# Patient Record
Sex: FEMALE | Race: BLACK OR AFRICAN AMERICAN | NOT HISPANIC OR LATINO | URBAN - METROPOLITAN AREA
[De-identification: names, ages, dates, MRNs, and addresses within clinical notes are randomized per-mention and may not be internally consistent; named-entity substitution may affect disease eponyms.]

---
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
## 2020-01-22 NOTE — ED ADULT NURSE NOTE - NEURO ASSESSMENT
Post-Care Instructions: I reviewed with the patient in detail post-care instructions. Patient is to wear sunprotection, and avoid picking at any of the treated lesions. Pt may apply Vaseline to crusted or scabbing areas. Detail Level: Detailed Render Post-Care Instructions In Note?: no Duration Of Freeze Thaw-Cycle (Seconds): 0 Consent: The patient's consent was obtained including but not limited to risks of crusting, scabbing, blistering, scarring, darker or lighter pigmentary change, recurrence, incomplete removal and infection. WDL

## 2021-09-12 ENCOUNTER — EMERGENCY (EMERGENCY)
Facility: HOSPITAL | Age: 21
LOS: 1 days | Discharge: HOME | End: 2021-09-12
Attending: EMERGENCY MEDICINE | Admitting: EMERGENCY MEDICINE
Payer: SELF-PAY

## 2021-09-12 VITALS
DIASTOLIC BLOOD PRESSURE: 52 MMHG | OXYGEN SATURATION: 100 % | TEMPERATURE: 97 F | HEART RATE: 102 BPM | RESPIRATION RATE: 16 BRPM | SYSTOLIC BLOOD PRESSURE: 115 MMHG

## 2021-09-12 VITALS
TEMPERATURE: 98 F | HEART RATE: 91 BPM | RESPIRATION RATE: 16 BRPM | HEIGHT: 66 IN | DIASTOLIC BLOOD PRESSURE: 74 MMHG | OXYGEN SATURATION: 96 % | SYSTOLIC BLOOD PRESSURE: 117 MMHG

## 2021-09-12 PROBLEM — Z78.9 OTHER SPECIFIED HEALTH STATUS: Chronic | Status: ACTIVE | Noted: 2019-09-11

## 2021-09-12 PROCEDURE — 99284 EMERGENCY DEPT VISIT MOD MDM: CPT

## 2021-09-12 PROCEDURE — 99053 MED SERV 10PM-8AM 24 HR FAC: CPT

## 2021-09-12 NOTE — ED PROVIDER NOTE - CLINICAL SUMMARY MEDICAL DECISION MAKING FREE TEXT BOX
20 yo F presenting for acute alcohol intoxication and vomiting. On arrival to ED pt no longer vomiting, not complaining of anything. Pt able to ambulate without assistance. Pt notes she can order a cab or reach out to a friend to get home.

## 2021-09-12 NOTE — ED PROVIDER NOTE - NSFOLLOWUPINSTRUCTIONS_ED_ALL_ED_FT
Alcohol Intoxication  WHAT YOU NEED TO KNOW:  Alcohol intoxication is a harmful physical condition caused when you drink more alcohol than your body can handle. It is also called ethanol poisoning, or being drunk.  DISCHARGE INSTRUCTIONS:  Call your local emergency number (911 in the ) if:   •You have sudden trouble breathing or chest pain.  •You have a seizure.  •You feel sad enough to harm yourself or others.  Call your doctor if:   •You have hallucinations (you see or hear things that are not real).  •You cannot stop vomiting.  •You have questions or concerns about your condition or care.  Recommended alcohol limits:   •Men 21 to 64 years should limit alcohol to 2 drinks a day. Do not have more than 4 drinks in 1 day or more than 14 in 1 week.  •All women, and men 65 or older should limit alcohol to 1 drink in a day. Do not have more than 3 drinks in 1 day or more than 7 in 1 week. No amount of alcohol is okay during pregnancy.  Manage alcohol use:   •Decrease the amount you drink. This can help prevent health problems such as brain, heart, and liver damage, high blood pressure, diabetes, and cancer. If you cannot stop completely, healthcare providers can help you set goals to decrease the amount you drink.  •Plan weekly alcohol use. You will be less likely to drink more than the recommended limit if you plan ahead.  •Have food when you drink alcohol. Food will prevent alcohol from getting into your system too quickly. Eat before you have your first alcohol drink.  •Time your drinks carefully. Have no more than 1 drink in an hour. Have a liquid such as water, coffee, or a soft drink between alcohol drinks.  •Do not drive if you have had alcohol. Make sure someone who has not been drinking can help you get home.  •Do not drink alcohol if you are taking medicine. Alcohol is dangerous when you combine it with certain medicines, such as acetaminophen or blood pressure medicine. Talk to your healthcare provider about all the medicines you currently take.  For more information:   •Alcoholics Anonymous  Web Address: http://www.aa.org  •Substance Abuse and Mental Health Services Administration  [address]  [place] [address]  Web Address: http://www.[facility].gov  Follow up with your healthcare provider as directed: Write down your questions so you remember to ask them during your visits.

## 2021-09-12 NOTE — ED PROVIDER NOTE - PHYSICAL EXAMINATION
General: WN/WD NAD  Head: Atraumatic, normocephalic  Eyes: EOM grossly in tact, no scleral icterus  ENT: moist mucous membranes  Neurology: A&Ox3, nonfocal, BLACK x 4, answers questions appropriately  Respiratory: normal respiratory effort, CTAB, no wheezing  CV: Extremities warm and well perfused, RRR  Abdominal: Soft, non-distended, non tender  Extremities: No edema  Skin: No rashes

## 2021-09-12 NOTE — ED ADULT NURSE NOTE - NSICDXPASTMEDICALHX_GEN_ALL_CORE_FT
Intubated.  Managed by critical care team.  Start enteral feeding.   PAST MEDICAL HISTORY:  No pertinent past medical history

## 2021-09-12 NOTE — ED ADULT TRIAGE NOTE - CHIEF COMPLAINT QUOTE
patient arrives intoxicated was found on side of street called by friend actively vomiting. patient had a lot of tequila to drink to night, no drug use. arouseable to physical stimuli, physical belongings secured acros room 21. valubles in security.

## 2021-09-12 NOTE — ED PROVIDER NOTE - OBJECTIVE STATEMENT
Ms. Harden is a 22 yo woman with anemia presenting for intoxication and vomiting. She notes that she went out tonight and drank a lot of tequila. She denies SI and notes it was a fun night she just drank too much. At this time not complaining of anything, denies nausea. She has no allergies and doesn't take any medications. She remembers everything that happened and feels safe going home.

## 2021-09-12 NOTE — ED PROVIDER NOTE - PATIENT PORTAL LINK FT
You can access the FollowMyHealth Patient Portal offered by [facility] by registering at the following website: [email or web address]. By joining [employer]’s FollowMyHealth portal, you will also be able to view your health information using other applications (apps) compatible with our system.

## 2021-09-12 NOTE — ED ADULT NURSE NOTE - OBJECTIVE STATEMENT
Patient A&Ox4 ambulatory coming in for intoxication. Patient states she was out with her friends and drank too much Tequila. Patient states she vomited a few times and feels much better now, would like to be sent home to rest. Respirations even and unlabored. Vitals as noted. MD at bedside for evaluation. Awaiting further orders. Belongings across 21 and valuables at security. Stretcher in lowest position, wheels locked, appropriate side rails in place, call bell in reach.

## 2023-08-11 ENCOUNTER — EMERGENCY (EMERGENCY)
Facility: HOSPITAL | Age: 23
LOS: 0 days | Discharge: HOME | End: 2023-08-11
Attending: STUDENT IN AN ORGANIZED HEALTH CARE EDUCATION/TRAINING PROGRAM
Payer: COMMERCIAL

## 2023-08-11 VITALS
TEMPERATURE: 98 F | HEART RATE: 81 BPM | HEIGHT: 66 IN | SYSTOLIC BLOOD PRESSURE: 132 MMHG | RESPIRATION RATE: 18 BRPM | OXYGEN SATURATION: 99 % | DIASTOLIC BLOOD PRESSURE: 92 MMHG | WEIGHT: 240.08 LBS

## 2023-08-11 VITALS
HEART RATE: 84 BPM | SYSTOLIC BLOOD PRESSURE: 128 MMHG | RESPIRATION RATE: 18 BRPM | OXYGEN SATURATION: 100 % | TEMPERATURE: 98 F | DIASTOLIC BLOOD PRESSURE: 88 MMHG

## 2023-08-11 DIAGNOSIS — R10.31 RIGHT LOWER QUADRANT PAIN: ICD-10-CM

## 2023-08-11 DIAGNOSIS — N94.6 DYSMENORRHEA, UNSPECIFIED: ICD-10-CM

## 2023-08-11 DIAGNOSIS — R10.32 LEFT LOWER QUADRANT PAIN: ICD-10-CM

## 2023-08-11 LAB
ALBUMIN SERPL ELPH-MCNC: 3.8 G/DL — SIGNIFICANT CHANGE UP (ref 3.3–5)
ALP SERPL-CCNC: 73 U/L — SIGNIFICANT CHANGE UP (ref 40–120)
ALT FLD-CCNC: 48 U/L — SIGNIFICANT CHANGE UP (ref 12–78)
ANION GAP SERPL CALC-SCNC: 4 MMOL/L — LOW (ref 5–17)
APPEARANCE UR: ABNORMAL
AST SERPL-CCNC: 26 U/L — SIGNIFICANT CHANGE UP (ref 15–37)
BACTERIA # UR AUTO: ABNORMAL
BASOPHILS # BLD AUTO: 0.07 K/UL — SIGNIFICANT CHANGE UP (ref 0–0.2)
BASOPHILS NFR BLD AUTO: 1 % — SIGNIFICANT CHANGE UP (ref 0–2)
BILIRUB SERPL-MCNC: 0.8 MG/DL — SIGNIFICANT CHANGE UP (ref 0.2–1.2)
BILIRUB UR-MCNC: NEGATIVE — SIGNIFICANT CHANGE UP
BUN SERPL-MCNC: 7 MG/DL — SIGNIFICANT CHANGE UP (ref 7–23)
CALCIUM SERPL-MCNC: 9 MG/DL — SIGNIFICANT CHANGE UP (ref 8.5–10.1)
CHLORIDE SERPL-SCNC: 109 MMOL/L — HIGH (ref 96–108)
CO2 SERPL-SCNC: 26 MMOL/L — SIGNIFICANT CHANGE UP (ref 22–31)
COLOR SPEC: YELLOW — SIGNIFICANT CHANGE UP
CREAT SERPL-MCNC: 0.74 MG/DL — SIGNIFICANT CHANGE UP (ref 0.5–1.3)
DIFF PNL FLD: ABNORMAL
EGFR: 117 ML/MIN/1.73M2 — SIGNIFICANT CHANGE UP
EOSINOPHIL # BLD AUTO: 0.22 K/UL — SIGNIFICANT CHANGE UP (ref 0–0.5)
EOSINOPHIL NFR BLD AUTO: 3.2 % — SIGNIFICANT CHANGE UP (ref 0–6)
EPI CELLS # UR: SIGNIFICANT CHANGE UP
GLUCOSE SERPL-MCNC: 86 MG/DL — SIGNIFICANT CHANGE UP (ref 70–99)
GLUCOSE UR QL: NEGATIVE MG/DL — SIGNIFICANT CHANGE UP
HCG SERPL-ACNC: <1 MIU/ML — SIGNIFICANT CHANGE UP
HCT VFR BLD CALC: 39.2 % — SIGNIFICANT CHANGE UP (ref 34.5–45)
HGB BLD-MCNC: 12.1 G/DL — SIGNIFICANT CHANGE UP (ref 11.5–15.5)
IMM GRANULOCYTES NFR BLD AUTO: 0.4 % — SIGNIFICANT CHANGE UP (ref 0–0.9)
KETONES UR-MCNC: ABNORMAL
LEUKOCYTE ESTERASE UR-ACNC: ABNORMAL
LYMPHOCYTES # BLD AUTO: 1.39 K/UL — SIGNIFICANT CHANGE UP (ref 1–3.3)
LYMPHOCYTES # BLD AUTO: 20.2 % — SIGNIFICANT CHANGE UP (ref 13–44)
MCHC RBC-ENTMCNC: 24 PG — LOW (ref 27–34)
MCHC RBC-ENTMCNC: 30.9 G/DL — LOW (ref 32–36)
MCV RBC AUTO: 77.6 FL — LOW (ref 80–100)
MONOCYTES # BLD AUTO: 0.41 K/UL — SIGNIFICANT CHANGE UP (ref 0–0.9)
MONOCYTES NFR BLD AUTO: 6 % — SIGNIFICANT CHANGE UP (ref 2–14)
NEUTROPHILS # BLD AUTO: 4.77 K/UL — SIGNIFICANT CHANGE UP (ref 1.8–7.4)
NEUTROPHILS NFR BLD AUTO: 69.2 % — SIGNIFICANT CHANGE UP (ref 43–77)
NITRITE UR-MCNC: NEGATIVE — SIGNIFICANT CHANGE UP
NRBC # BLD: 0 /100 WBCS — SIGNIFICANT CHANGE UP (ref 0–0)
PH UR: 5 — SIGNIFICANT CHANGE UP (ref 5–8)
PLATELET # BLD AUTO: 319 K/UL — SIGNIFICANT CHANGE UP (ref 150–400)
POTASSIUM SERPL-MCNC: 4.5 MMOL/L — SIGNIFICANT CHANGE UP (ref 3.5–5.3)
POTASSIUM SERPL-SCNC: 4.5 MMOL/L — SIGNIFICANT CHANGE UP (ref 3.5–5.3)
PROT SERPL-MCNC: 7.1 GM/DL — SIGNIFICANT CHANGE UP (ref 6–8.3)
PROT UR-MCNC: 30 MG/DL
RBC # BLD: 5.05 M/UL — SIGNIFICANT CHANGE UP (ref 3.8–5.2)
RBC # FLD: 16 % — HIGH (ref 10.3–14.5)
RBC CASTS # UR COMP ASSIST: >50 /HPF (ref 0–4)
SODIUM SERPL-SCNC: 139 MMOL/L — SIGNIFICANT CHANGE UP (ref 135–145)
SP GR SPEC: 1.02 — SIGNIFICANT CHANGE UP (ref 1.01–1.02)
UROBILINOGEN FLD QL: NEGATIVE MG/DL — SIGNIFICANT CHANGE UP
WBC # BLD: 6.89 K/UL — SIGNIFICANT CHANGE UP (ref 3.8–10.5)
WBC # FLD AUTO: 6.89 K/UL — SIGNIFICANT CHANGE UP (ref 3.8–10.5)
WBC UR QL: SIGNIFICANT CHANGE UP

## 2023-08-11 PROCEDURE — 99285 EMERGENCY DEPT VISIT HI MDM: CPT

## 2023-08-11 PROCEDURE — 76830 TRANSVAGINAL US NON-OB: CPT | Mod: 26

## 2023-08-11 PROCEDURE — 74177 CT ABD & PELVIS W/CONTRAST: CPT | Mod: 26,MA

## 2023-08-11 RX ORDER — KETOROLAC TROMETHAMINE 30 MG/ML
15 SYRINGE (ML) INJECTION ONCE
Refills: 0 | Status: DISCONTINUED | OUTPATIENT
Start: 2023-08-11 | End: 2023-08-11

## 2023-08-11 RX ORDER — SODIUM CHLORIDE 9 MG/ML
1000 INJECTION INTRAMUSCULAR; INTRAVENOUS; SUBCUTANEOUS ONCE
Refills: 0 | Status: COMPLETED | OUTPATIENT
Start: 2023-08-11 | End: 2023-08-11

## 2023-08-11 RX ADMIN — Medication 15 MILLIGRAM(S): at 17:15

## 2023-08-11 RX ADMIN — SODIUM CHLORIDE 1000 MILLILITER(S): 9 INJECTION INTRAMUSCULAR; INTRAVENOUS; SUBCUTANEOUS at 17:15

## 2023-08-11 NOTE — ED ADULT NURSE NOTE - OBJECTIVE STATEMENT
pt c/o abdominal pain x2 days. pt used hot packs and Tylenol w/o relief. pt last took Tylenol @ 1400. pt denies any international travel or trauma to the area. pt is currently menstruating. pt denies any NVD at this time. PMH of anemia.

## 2023-08-11 NOTE — ED PROVIDER NOTE - PHYSICAL EXAMINATION
Gen: AOx3, NAD  Head: NCAT  ENT: Airway patent, moist mucous membranes, nasal passageways clear   Cardiac: Normal rate, normal rhythm  Respiratory: Lungs CTA B/L  Gastrointestinal: Abdomen soft, mod b/l lower abd ttp,  nondistended, no rebound, no guarding  MSK: No gross abnormalities, FROM of all four extremities, no edema  HEME: Extremities warm and well perfused   Skin: No rashes, no lesions  Neuro: No gross neurologic deficits,

## 2023-08-11 NOTE — ED PROVIDER NOTE - NSFOLLOWUPINSTRUCTIONS_ED_ALL_ED_FT
You were seen today for abdominal pain - you did not have acute findings on ultrasound or CT scan    You can continue ibuprofen 600mg every 8 hours as needed for pain (take with food)       Acute Abdominal Pain    WHAT YOU NEED TO KNOW:    The cause of your abdominal pain may not be found. If a cause is found, treatment will depend on what the cause is.     DISCHARGE INSTRUCTIONS:    Return to the emergency department if:     You vomit blood or cannot stop vomiting.      You have blood in your bowel movement or it looks like tar.       You have bleeding from your rectum.       Your abdomen is larger than usual, more painful, and hard.       You have severe pain in your abdomen.       You stop passing gas and having bowel movements.       You feel weak, dizzy, or faint.    Contact your healthcare provider if:     You have a fever.      You have new signs and symptoms.      Your symptoms do not get better with treatment.       You have questions or concerns about your condition or care.    Medicines may be given to decrease pain, treat an infection, and manage your symptoms. Take your medicine as directed. Call your healthcare provider if you think your medicine is not helping or if you have side effects. Tell him if you are allergic to any medicine. Keep a list of the medicines, vitamins, and herbs you take. Include the amounts, and when and why you take them. Bring the list or the pill bottles to follow-up visits. Carry your medicine list with you in case of an emergency.    Manage your symptoms:     Apply heat on your abdomen for 20 to 30 minutes every 2 hours for as many days as directed. Heat helps decrease pain and muscle spasms.       Manage your stress. Stress may cause abdominal pain. Your healthcare provider may recommend relaxation techniques and deep breathing exercises to help decrease your stress. Your healthcare provider may recommend you talk to someone about your stress or anxiety, such as a counselor or a trusted friend. Get plenty of sleep and exercise regularly.       Limit or do not drink alcohol. Alcohol can make your abdominal pain worse. Ask your healthcare provider if it is safe for you to drink alcohol. Also ask how much is safe for you to drink.       Do not smoke. Nicotine and other chemicals in cigarettes can damage your esophagus and stomach. Ask your healthcare provider for information if you currently smoke and need help to quit. E-cigarettes or smokeless tobacco still contain nicotine. Talk to your healthcare provider before you use these products.     Make changes to the food you eat as directed: Do not eat foods that cause abdominal pain or other symptoms. Eat small meals more often.     Eat more high-fiber foods if you are constipated. High-fiber foods include fruits, vegetables, whole-grain foods, and legumes.       Do not eat foods that cause gas if you have bloating. Examples include broccoli, cabbage, and cauliflower. Do not drink soda or carbonated drinks, because these may also cause gas.       Do not eat foods or drinks that contain sorbitol or fructose if you have diarrhea and bloating. Some examples are fruit juices, candy, jelly, and sugar-free gum.       Do not eat high-fat foods, such as fried foods, cheeseburgers, hot dogs, and desserts.      Limit or do not drink caffeine. Caffeine may make symptoms, such as heart burn or nausea, worse.       Drink plenty of liquids to prevent dehydration from diarrhea or vomiting. Ask your healthcare provider how much liquid to drink each day and which liquids are best for you.     Follow up with your healthcare provider as directed: Write down your questions so you remember to ask them during your visits.

## 2023-08-11 NOTE — ED PROVIDER NOTE - OBJECTIVE STATEMENT
23F no pmhx, no psxh who presents with b/l lower abd cramping pain, constant x 2 days, improved with tylenol, currently menstruating. denies fevers, nausea, vomiting, or diarrhea. symptoms feel much worse than usual menstrual cramps

## 2023-08-11 NOTE — ED ADULT NURSE NOTE - NS ED NURSE LEVEL OF CONSCIOUSNESS ORIENTATION
Oliver Vogel DO   acetaminophen (TYLENOL) 325 MG tablet Take 2 tablets by mouth every 4 hours as needed for Pain 12/31/17   Harini Lopez MD   aspirin 81 MG tablet Take 1 tablet by mouth daily 1/8/18   Harini Lopez MD   vitamin B-12 1000 MCG tablet Take 1 tablet by mouth daily 1/1/18   Harini Lopez MD   clopidogrel (PLAVIX) 75 MG tablet Take 1 tablet by mouth every other day 1/8/18   Harini Lopez MD   pantoprazole (PROTONIX) 40 MG tablet Take 1 tablet by mouth every morning (before breakfast) 1/1/18   Harini Lopez MD   ferrous sulfate (FE TABS) 325 (65 Fe) MG EC tablet Take 1 tablet by mouth 2 times daily 12/31/17   Harini Lopez MD   HYDROcodone-acetaminophen (NORCO) 5-325 MG per tablet Take 1 tablet by mouth every 6 hours as needed for Pain . Historical Provider, MD   cephALEXin (KEFLEX) 500 MG capsule Take 1 capsule by mouth 2 times daily 10/10/17   FARHANA Jones   sertraline (ZOLOFT) 50 MG tablet TAKE ONE TABLET BY MOUTH ONCE DAILY 9/15/17   Hany Blackwell MD   losartan (COZAAR) 50 MG tablet Take 1 tablet by mouth daily 9/15/17   Hany Blackwell MD   senna-docusate (DOK PLUS) 8.6-50 MG per tablet TAKE ONE TABLET BY MOUTH ONCE DAILY 9/15/17   Hany Blackwell MD   Omega-3 Fatty Acids (FISH OIL BURP-LESS) 1200 MG CAPS Take 1 tablet by mouth daily Krill oil 9/15/17   Hany Blackwell MD   tamsulosin [facility]) 0.4 MG capsule Take 1 capsule by mouth 2 times daily 9/15/17 9/15/18  Hany Blackwell MD   metoprolol tartrate (LOPRESSOR) 25 MG tablet Take 0.5 tablets by mouth daily Substitute for atenolol.  9/15/17   Hany Blackwell MD   atorvastatin (LIPITOR) 20 MG tablet Take 20 mg by mouth daily    Historical Provider, MD   KRILL OIL PO Take 1,000 mg by mouth daily    Historical Provider, MD   Cholecalciferol (VITAMIN D PO) Take 2,000 Units by mouth daily With calcium    Historical Provider, MD   Coenzyme Q10 (COQ-10 PO) Take 10 mg by mouth daily    Historical Provider MD   TURMERIC PO Take 1,000 mg by mouth daily    Historical Provider, MD   Multiple Vitamins-Minerals (THERAPEUTIC MULTIVITAMIN-MINERALS) tablet Take 1 tablet by mouth daily    Historical Provider, MD   isosorbide mononitrate (IMDUR) 30 MG CR tablet Take 30 mg by mouth daily.     Historical Provider, MD       Future Appointments  Date Time Provider Miguel Romo   1/17/2018 11:45 AM MD ROMAIN RhodesGASL [facility]   1/30/2018 1:00 PM FARHANA Pruitt ENT [facility] MARK AM [place] [address]   2/5/2018 11:30 AM Bhavna Hartmann, DO SRPX SOOD [facility]   2/20/2018 11:00 AM Bhavna Hartmann DO ALBERTX BARRIE [facility] [place] [address]   9/18/2018 1:00 PM DO CHARLENE Blanchard KIDNEY [facility] AM [place] [address] Oriented - self; Oriented - place; Oriented - time

## 2023-08-11 NOTE — ED PROVIDER NOTE - NS ED ROS FT
Gen: No fever, normal appetite  Resp: No cough or trouble breathing  Cardiovascular: No chest pain or palpitation  Gastroenteric: see HPI   :  No change in urine output; no dysuria  MS: No joint or muscle pain  Skin: No rashes  Remainder negative, except as per the HPI

## 2023-08-11 NOTE — ED ADULT TRIAGE NOTE - CHIEF COMPLAINT QUOTE
Patient complaining of menses pain that started 3 days ago that got worsen today around 1PM, pt states her bleeding has worsen. Denies nay chest pain, SOB, dizziness. Pmx: Anemia

## 2023-08-11 NOTE — ED PROVIDER NOTE - PATIENT PORTAL LINK FT
You can access the FollowMyHealth Patient Portal offered by [facility] by registering at the following website: [email or web address]. By joining [employer]’s FollowMyHealth portal, you will also be able to view your health information using other applications (apps) compatible with our system.

## 2023-08-11 NOTE — ED PROVIDER NOTE - CLINICAL SUMMARY MEDICAL DECISION MAKING FREE TEXT BOX
23F pw 2 days b/l lower abd pain, + TTP RLQ and LLQ, no rebound/gaurding or fevers. Pt currently menstruating. Denies abnormal vaginal discharge except menstruation. Plan for ct a/p rule out appendicitis, tvus rule out torsion or cysts, analgesia with toradol, IV fluids, check labs for leukocytosis or metabolic derangements    - pt clinically improved, labs/ct/ TVUS explained to pt and pt provided copy of results for outpt follow up

## 2023-08-13 LAB
CULTURE RESULTS: SIGNIFICANT CHANGE UP
SPECIMEN SOURCE: SIGNIFICANT CHANGE UP

## 2023-10-22 NOTE — ED PROVIDER NOTE - CARDIAC, MLM
75 year old female, A&Ox4, PMH of HTN, breast and liver CA in remission, BIB EMS from home for syncopal episode. Patient states she was sitting down eating dinner when she started to feel faint, and syncopized, sliding down the chair to the ground. Patient unsure if she lost consciousness or sustained any trauma. States her husbands HHA was there to help her. States she is feeling much better, denies CP, dizziness, palpitations, SOB, difficulty breathing at this time. States she normally ambulates on her own with walker. Patient placed on cardiac monitor, sinus bradycaria 58. Respirations clear and equal bilaterally. Abdomen soft, nontender and nondistended. Peripheral pulses strong and equal bilaterally. IV placed and labs drawn. Safety and comfort measures maintained.
Normal rate, regular rhythm.  Heart sounds S1, S2.  No murmurs, rubs or gallops.

## 2024-05-10 NOTE — ED PROVIDER NOTE - INTERNATIONAL TRAVEL
Assessment/Plan:     Diagnoses and all orders for this visit:    Macromastia  S/P bilateral breast reduction  She underwent reduction mammoplasty on 5/15/23 by Dr. Eastman. Bilateral breasts are soft, supple & symmetric. Incisions have healed nicely. She is overall very pleased with her results. We will see her back as needed.         Subjective:      Patient ID: Janna Diamond is a 53 y.o. female.    HPI    Pt is here for a post-op visit. She underwent reduction mammoplasty on 5/15/23 by Dr. Eastman.  She is overall very pleased with her results.     Patient Active Problem List   Diagnosis    Macromastia    S/P bilateral breast reduction     No Known Allergies  Current Outpatient Medications on File Prior to Visit   Medication Sig    Ascorbic Acid (vitamin C) 1000 MG tablet Take 1,000 mg by mouth daily    cholecalciferol (VITAMIN D3) 1,000 units tablet Take 1,000 Units by mouth daily    Multiple Vitamins-Minerals (multivitamin with minerals) tablet Take 1 tablet by mouth daily    oxyCODONE-acetaminophen (Percocet) 5-325 mg per tablet Take 1 tablet by mouth every 4 (four) hours as needed for moderate pain Max Daily Amount: 6 tablets    zinc gluconate 50 mg tablet Take 50 mg by mouth daily     No current facility-administered medications on file prior to visit.     No family history on file.  Past Medical History:   Diagnosis Date    Irregular heart beat     PVC's     Social History     Socioeconomic History    Marital status: /Civil Union     Spouse name: Not on file    Number of children: Not on file    Years of education: Not on file    Highest education level: Not on file   Occupational History    Not on file   Tobacco Use    Smoking status: Never    Smokeless tobacco: Never   Substance and Sexual Activity    Alcohol use: Yes     Comment: Social - 2 glasses of wine a month    Drug use: Never    Sexual activity: Not on file   Other Topics Concern    Not on file   Social History Narrative    Not on file      Social Determinants of Health     Financial Resource Strain: Not on file   Food Insecurity: Not on file   Transportation Needs: Not on file   Physical Activity: Not on file   Stress: Not on file   Social Connections: Not on file   Intimate Partner Violence: Not on file   Housing Stability: Not on file     Past Surgical History:   Procedure Laterality Date    MENISCECTOMY Left     VT BREAST REDUCTION Bilateral 5/15/2023    Procedure: BILATERAL REDUCTION MAMMOPLASTY;  Surgeon: Fito Eastman MD;  Location: [facility] MAIN [facility];  Service: Plastics    ROTATOR CUFF REPAIR Right          Review of Systems   All other systems reviewed and are negative.        Objective:      LMP  (LMP Unknown)          Physical Exam  Constitutional:       Appearance: Normal appearance. She is well-developed.   HENT:      Head: Normocephalic and atraumatic.   Eyes:      Conjunctiva/sclera: Conjunctivae normal.   Pulmonary:      Effort: Pulmonary effort is normal.      Comments: Bilateral breasts are soft, supple & symmetric. Incisions have healed nicely. See photos in media.   Musculoskeletal:         General: Normal range of motion.      Cervical back: Normal range of motion.   Skin:     General: Skin is warm and dry.   Neurological:      Mental Status: She is alert and oriented to person, place, and time.   Psychiatric:         Mood and Affect: Mood normal.         Behavior: Behavior normal.          No

## 2025-04-29 NOTE — ED PROVIDER NOTE - ENMT, MLM
PROGRESS NOTE       Luis Brenner is a 50 year old here for Physical   The patient is a 50-year-old female who presents for a complete physical.    Her last labs were done in February 2024. Her glucose was 102 and A1c was 5.8. At her visit in March 2024, there was discussion about starting metformin and a lipid-lowering medication. However, she wanted to work on her diet and exercise for 6 months. She is scheduled for a coronary calcium screen at the end of May 2025.    3/2024 Her cholesterol total was 274, triglycerides 176, , and HDL 50.  She has been working on a healthier diet, is exercising daily and weight bearing    She had a Pap smear at that time, which was negative for HPV and cytology was negative. She has a cervical polyp, was suppose to follow up with [facility] but has not done so yet.     She is due for colon cancer screening, pneumonia vaccination, shingles vaccination, and Tdap. She is scheduled for a coronary calcium screen at the end of May 2025.    Her last mammogram was done in March 2024. She needed to have a diagnostic mammogram and ultrasound of the left breast for cysts/clusters, which were benign, and she was advised to return to her annual mammogram screening after that.    Her vitamin D level was 24.1.  Is taking Vit D, Calcium, probiotic and magnesium.         Review of Systems  As documented above.    Current Outpatient Medications   Medication Sig Dispense Refill    cycloSPORINE (RESTASIS) 0.05 % ophthalmic emulsion Place 1 drop into both eyes in the morning and 1 drop in the evening.      Multiple Vitamin (MULTIVITAMIN ADULT PO)       VITAMIN D, CHOLECALCIFEROL, PO       TURMERIC PO       Lactobacillus (PROBIOTIC ACIDOPHILUS PO)       ELDERBERRY PO        No current facility-administered medications for this visit.     Patient Active Problem List   Diagnosis    Anxiety    Numerous moles    Back muscle spasm     Past Medical History:   Diagnosis Date    Atypical nevus      Lyme disease     No known problems     Skipped the last 2 months of menstrual cycle, not pregnant.     Past Surgical History:   Procedure Laterality Date    Mouth surgery      No past surgeries      Oral surgery procedure       Family History   Problem Relation Age of Onset    Stroke Mother     Hypertension Mother         Blood pressure medication    Cancer Father         Bladder Cancer    Stroke Father     Lymphoma Father     Clotting Disorder Father     Cancer, Breast Paternal Aunt     Heart Maternal Grandmother          SDOH Some Days Smoker        Objective   Vitals:    04/29/25 0926   BP: 100/66   Pulse: 85   Resp: 16   Temp: 98.4 °F (36.9 °C)   TempSrc: Temporal   SpO2: 96%   Weight: 76.9 kg (169 lb 8 oz)   Height: 5' 9\" (1.753 m)   BMI (Calculated): 25.03     Physical Exam  General: Alert. Normal appearance.  Head: Normocephalic.  Eyes: Conjunctivae normal.  Ear: Tympanic membranes and external ear canals normal.  Nose: Nares normal. No sinus tenderness.  Throat: Lips, mucosa, and tongue normal. Pharyngeal mucosa non-inflamed. Teeth normal.  Neck: Supple.Thyroid normal. Lymphadenopathy is not present.  Breasts: Symmetrical without masses. No skin changes. No nipple discharge. No axillary lymphadenopathy.  Cardiovascular: Normal rate and regular rhythm. Normal S1, S2. Murmurs not present.  Respiratory: Normal respiratory effort. No wheezing, rales or rhonchi.  Abdomen: Soft, non-tender and bowel sounds active. No masses or organomegaly.  Musculoskeletal: Upper and lower extremity strength and range of motion is normal. Lower extremity edema not present. Gait is normal.  Neurologic: Cranial nerves 2-12 grossly intact. No focal deficits.  Genitourinary: Exam deferred.  Psychiatric: Mood is normal, affect is normal, thought content is normal.         1. Health maintenance.  - Blood pressure readings are within the normal range.  - Due for colon cancer screening, pneumonia vaccination, shingles vaccination, and  Tdap.  Declines today  - Scheduled for a coronary calcium screen at the end of 05/2025.  - Last mammogram was done in 03/2024, with a diagnostic mammogram and ultrasound of the left breast for cysts/clusters, which were benign. Advised to return to annual mammogram screening after that. Mammogram order has been placed.  - Advised to maintain a healthy diet rich in fruits, vegetables, and lean proteins to protect against heart disease and diabetes.  - Information regarding colonoscopy has been provided.  - Blood work will be conducted today.    2. Menopause.  - Last menstrual period was in 02/2025 and has not experienced any periods since then.  - Not experiencing any other symptoms like hot flashes or vaginal dryness.  - Explained that she needs to be without her cycle for a year before being considered postmenopausal.      1. Routine health maintenance  -     CBC with Automated Differential  -     Comprehensive Metabolic Panel  -     Glycohemoglobin  -     Lipid Panel With Reflex  -     Thyroid Stimulating Hormone Reflex  -     Vitamin D -25 Hydroxy  2. Mixed hyperlipidemia  -     CBC with Automated Differential  -     Comprehensive Metabolic Panel  -     Glycohemoglobin  -     Lipid Panel With Reflex  -     Thyroid Stimulating Hormone Reflex  -     Vitamin D -25 Hydroxy  3. Elevated blood sugar  -     CBC with Automated Differential  -     Comprehensive Metabolic Panel  -     Glycohemoglobin  -     Lipid Panel With Reflex  -     Thyroid Stimulating Hormone Reflex  -     Vitamin D -25 Hydroxy  4. Encounter for screening mammogram for malignant neoplasm of breast  -     MAMMO SCREENING BILATERAL; Future  5. Cervical polyp  -     SERVICE TO OB GYN    Return in about 1 year (around 4/29/2026).        Jo Jarquin PA-C   Airway patent, Nasal mucosa clear. Mouth with normal mucosa. Throat has no vesicles, no oropharyngeal exudates and uvula is midline.